# Patient Record
Sex: FEMALE | Race: WHITE | NOT HISPANIC OR LATINO | Employment: OTHER | URBAN - METROPOLITAN AREA
[De-identification: names, ages, dates, MRNs, and addresses within clinical notes are randomized per-mention and may not be internally consistent; named-entity substitution may affect disease eponyms.]

---

## 2017-02-27 RX ORDER — BISOPROLOL FUMARATE 5 MG/1
TABLET, FILM COATED ORAL
Qty: 90 TABLET | Refills: 0 | Status: SHIPPED | OUTPATIENT
Start: 2017-02-27 | End: 2017-03-30 | Stop reason: SDUPTHER

## 2017-03-30 RX ORDER — BISOPROLOL FUMARATE 5 MG/1
TABLET, FILM COATED ORAL
Qty: 30 TABLET | Refills: 5 | Status: SHIPPED | OUTPATIENT
Start: 2017-03-30 | End: 2017-03-30 | Stop reason: SDUPTHER

## 2017-03-30 RX ORDER — BISOPROLOL FUMARATE 5 MG/1
TABLET, FILM COATED ORAL
Qty: 90 TABLET | Refills: 1 | Status: SHIPPED | OUTPATIENT
Start: 2017-03-30 | End: 2017-09-24 | Stop reason: SDUPTHER

## 2017-04-17 RX ORDER — FUROSEMIDE 40 MG/1
TABLET ORAL
Qty: 90 TABLET | Refills: 0 | Status: SHIPPED | OUTPATIENT
Start: 2017-04-17 | End: 2017-07-15 | Stop reason: SDUPTHER

## 2017-04-19 ENCOUNTER — OFFICE VISIT (OUTPATIENT)
Dept: CARDIOLOGY | Facility: CLINIC | Age: 82
End: 2017-04-19

## 2017-04-19 VITALS
HEART RATE: 84 BPM | DIASTOLIC BLOOD PRESSURE: 60 MMHG | SYSTOLIC BLOOD PRESSURE: 140 MMHG | BODY MASS INDEX: 21.35 KG/M2 | HEIGHT: 62 IN | WEIGHT: 116 LBS

## 2017-04-19 DIAGNOSIS — I50.22 CHRONIC SYSTOLIC CONGESTIVE HEART FAILURE (HCC): ICD-10-CM

## 2017-04-19 DIAGNOSIS — I42.9 CARDIOMYOPATHY (HCC): ICD-10-CM

## 2017-04-19 DIAGNOSIS — I48.0 PAROXYSMAL ATRIAL FIBRILLATION (HCC): ICD-10-CM

## 2017-04-19 DIAGNOSIS — R55 NEAR SYNCOPE: Primary | ICD-10-CM

## 2017-04-19 PROCEDURE — 93000 ELECTROCARDIOGRAM COMPLETE: CPT | Performed by: INTERNAL MEDICINE

## 2017-04-19 PROCEDURE — 99214 OFFICE O/P EST MOD 30 MIN: CPT | Performed by: INTERNAL MEDICINE

## 2017-04-19 RX ORDER — MIRTAZAPINE 15 MG/1
15 TABLET, FILM COATED ORAL NIGHTLY
COMMUNITY

## 2017-04-19 NOTE — PROGRESS NOTES
Date of Office Visit: 17  Encounter Provider: Nolberto Dye MD  Place of Service: [facility] CARDIOLOGY  Patient Name: Janna Finley  :10/29/1932      Chief Complaint   Patient presents with   • Cardiomyopathy   • Congestive Heart Failure     History of Present Illness  HPI Comments:  The patient is an 83-year-old female with a history of nonischemic cardiomyopathy.  Her  initial left ventricular ejection fraction was 30%.  Subsequent echocardiogram showed that  her LV function had returned to normal.  She has had chronic dyspnea but normal cardiac  evaluations.  She then, in 2015, was switching family physicians and presented to her office and was  having abdominal discomfort, doubled over.  She was sent to the emergency room.  When she  went there, she was found to have severe acute diverticulitis but was also found to be in  atrial fibrillation.  She was seen by Dr. Ana Jefferson.  She had an echocardiogram.   Apparently, she had a valve that was a little leaky, which is chronic.  She was  rate-controlled and placed on Eliquis.  She has also now been diagnosed with chronic  obstructive pulmonary disease.   The patient unfortunately presented to [facility] at the end of 2015  with the abrupt onset of right arm and right leg weakness, aphasia. While there she had  an extensive evaluation. She ruled in for a cerebrovascular accident. She had an  echocardiogram and then a transesophageal echocardiogram which revealed a left ventricular  ejection fraction of approximately 35-40%. The left atrium was severely enlarged but no  thrombus seen. The right atrial cavity was moderately enlarged. She had no significant  valvular disease and no evidence of thrombus and grade III atheroma. They felt that her  Eliquis dose should be increased and they upped it to 5 mg per day. She also had some  congestive heart failure there reportedly and started her on Lasix as  well as  spironolactone and she was started on a statin. She now comes in for followup.  She first stated she is doing fine at that her sister to that she's having these episodes then this detail laboratory  Where she acutely just sitting there becomes diaphoretic dizzy lightheaded and near syncopal she is not lost consciousness.  She's had no chest pain or pressure.  No palpitations.  No paralysis, paresthesias or dysarthria.  She has her baseline dyspnea which is been unchanged and no orthopnea or PND.  She gets these episodes of near-syncope at least once a week.    Cardiomyopathy   Pertinent negatives include no abdominal pain, congestion, diaphoresis, fever, headaches, joint swelling, myalgias, nausea, numbness, rash, vertigo, vomiting or weakness.   Congestive Heart Failure   Associated symptoms include shortness of breath. Pertinent negatives include no abdominal pain, muscle weakness or nocturia.   Atrial Fibrillation   Symptoms include dizziness and shortness of breath. Symptoms are negative for weakness. Past medical history includes atrial fibrillation and CHF. There is no history of AICD.         Past Medical History:   Diagnosis Date   • Atrial fibrillation    • Breast cancer    • Diastolic dysfunction     grade II   • Difficulty breathing    • Diverticulosis    • Dizziness    • History of CT scan     lung pulmonary nodule (<6cm)   • Hypertension    • IBS (irritable bowel syndrome)    • Left atrial enlargement    • Mitral valve insufficiency    • Nonischemic cardiomyopathy          Past Surgical History:   Procedure Laterality Date   • APPENDECTOMY     • BREAST SURGERY     • CARDIAC CATHETERIZATION     • TONSILLECTOMY           Current Outpatient Prescriptions on File Prior to Visit   Medication Sig Dispense Refill   • albuterol (PROAIR HFA) 108 (90 BASE) MCG/ACT inhaler as needed.     • alendronate (FOSAMAX) 70 MG tablet Take 1 tablet by mouth once a week.     • apixaban (ELIQUIS) 5 MG tablet tablet  Take 1 tablet by mouth every 12 (twelve) hours. 180 tablet 3   • atorvastatin (LIPITOR) 40 MG tablet Take 1 tablet by mouth daily. Take 1 tablet daily at bedtime 90 tablet 1   • bisoprolol (ZEBeta) 5 MG tablet TAKE 1 TABLET BY MOUTH DAILY 90 tablet 1   • budesonide-formoterol (SYMBICORT) 160-4.5 MCG/ACT inhaler Take 2 puffs by nebulization 2 (two) times a day. Rinse mouth after use     • Calcium 600 MG tablet Take 1 tablet by mouth daily.     • furosemide (LASIX) 40 MG tablet TAKE 1 TABLET BY MOUTH DAILY 90 tablet 0   • Multiple Vitamin (MULTIVITAMIN) capsule Take 1 capsule by mouth daily.     • spironolactone (ALDACTONE) 50 MG tablet Take 1 tablet by mouth daily. Take 1 tablet daily 90 tablet 3   • [DISCONTINUED] acetaminophen (TYLENOL) 325 MG tablet Take 1 tablet by mouth as needed.     • [DISCONTINUED] escitalopram (LEXAPRO) 10 MG tablet Take 20 mg by mouth daily.     • [DISCONTINUED] Probiotic capsule Take by mouth. Use as directed       No current facility-administered medications on file prior to visit.          Social History     Social History   • Marital status:      Spouse name: N/A   • Number of children: N/A   • Years of education: N/A     Occupational History   • Not on file.     Social History Main Topics   • Smoking status: Never Smoker   • Smokeless tobacco: Not on file   • Alcohol use 0.6 oz/week     1 Glasses of wine per week   • Drug use: Not on file   • Sexual activity: Not on file     Other Topics Concern   • Not on file     Social History Narrative       Family History   Problem Relation Age of Onset   • Heart attack Mother    • Breast cancer Mother    • Heart attack Father    • Testicular cancer Son    • Stroke Maternal Grandmother    • Hypertension Other          Review of Systems   Constitution: Negative for decreased appetite, diaphoresis, fever, weakness, malaise/fatigue, weight gain and weight loss.   HENT: Negative for congestion, headaches, hearing loss, nosebleeds and tinnitus.  "   Eyes: Negative for blurred vision, double vision, vision loss in left eye, vision loss in right eye and visual disturbance.   Cardiovascular: Negative for orthopnea.        As noted in HPI   Respiratory: Positive for shortness of breath.         As noted HPI   Endocrine: Negative for cold intolerance and heat intolerance.   Hematologic/Lymphatic: Negative for bleeding problem. Does not bruise/bleed easily.   Skin: Negative for color change, flushing, itching and rash.   Musculoskeletal: Negative for arthritis, back pain, joint pain, joint swelling, muscle weakness and myalgias.   Gastrointestinal: Negative for bloating, abdominal pain, constipation, diarrhea, dysphagia, heartburn, hematemesis, hematochezia, melena, nausea and vomiting.   Genitourinary: Negative for bladder incontinence, dysuria, frequency, nocturia and urgency.   Neurological: Positive for dizziness and light-headedness. Negative for focal weakness, loss of balance, numbness, paresthesias and vertigo.   Psychiatric/Behavioral: Negative for depression, memory loss and substance abuse.       Procedures      ECG 12 Lead  Date/Time: 4/19/2017 1:24 PM  Performed by: PRAVIN CROFT  Authorized by: PRAVIN CROFT   Comparison: compared with previous ECG   Comparison to previous ECG: Back in afib  Rhythm: atrial fibrillation  Rate: normal  QRS axis: normal                 Objective:    /60 (BP Location: Right arm)  Pulse 84  Ht 61.5\" (156.2 cm)  Wt 116 lb (52.6 kg)  BMI 21.56 kg/m2       Physical Exam  Physical Exam   Constitutional: She is oriented to person, place, and time. She appears well-developed and well-nourished. No distress.   HENT:   Head: Normocephalic.   Eyes: Conjunctivae are normal. Pupils are equal, round, and reactive to light. No scleral icterus.   Neck: Normal carotid pulses, no hepatojugular reflux and no JVD present. Carotid bruit is not present. No tracheal deviation, no edema and no erythema present. No thyromegaly " present.   Cardiovascular: Normal rate, S1 normal, S2 normal, normal heart sounds and intact distal pulses.  An irregularly irregular rhythm present.  No extrasystoles are present. PMI is not displaced.  Exam reveals no distant heart sounds and no friction rub.    No murmur heard.  Pulses:       Carotid pulses are 2+ on the right side, and 2+ on the left side.       Radial pulses are 2+ on the right side, and 2+ on the left side.        Femoral pulses are 2+ on the right side, and 2+ on the left side.       Dorsalis pedis pulses are 2+ on the right side, and 2+ on the left side.        Posterior tibial pulses are 2+ on the right side, and 2+ on the left side.   Pulmonary/Chest: Effort normal. No respiratory distress. She has decreased breath sounds in the right lower field and the left lower field. She has no wheezes. She has no rhonchi. She has no rales. She exhibits no tenderness.   Abdominal: Soft. Bowel sounds are normal. She exhibits no distension and no mass. There is no hepatosplenomegaly. There is no tenderness. There is no rebound and no guarding.   Musculoskeletal: She exhibits no edema, tenderness or deformity.   Neurological: She is alert and oriented to person, place, and time.   Skin: Skin is warm and dry. No rash noted. She is not diaphoretic. No cyanosis or erythema. No pallor. Nails show no clubbing.   Psychiatric: She has a normal mood and affect. Her speech is normal and behavior is normal. Judgment and thought content normal.           Assessment:   1. This is an 83-year-old female with history of nonischemic cardiomyopathy. An  echocardiogram in 08/2015 revealed a left ventricular ejection fraction of 35-40%.   Heart Failure  Assessment  • NYHA class II - There is slight limitation of physical activity. The patient is comfortable at rest, but physical activity results in fatigue, palpitations or shortness of breath.  • ACE inhibitor not prescribed for medical reasons  • Beta blocker  prescribed  • Diuretics prescribed  • Angiotensin receptor blocker (ARB) not prescribed for medical reasons  • Calcium channel blockers not prescribed  • Left ventricular function is moderately reduced by qualitative assessment    Plan  • The patient has received heart failure education on the following topics: dietary sodium restriction, medication instructions, minimizing or avoiding NSAID use, symptom management, physical activity, weight monitoring and minimizing alcohol intake  • The heart failure care plan was discussed with the patient today including: continuing the current program    Subjective/Objective  • The patient reports dyspnea    • Physical exam findings negative for rales and elevated JVP.    She had been on lisinopril but her blood pressure was low. Will continue the same.      2. Paroxysmal atrial fibrillation.   Atrial Fibrillation and Atrial Flutter  Assessment  • The patient has paroxysmal atrial fibrillation  • This is non-valvular in etiology  • The patient's CHADS2-VASc score is 8  • A [id number] score of 2 or more is considered a high risk for a thromboembolic event  • Apixaban prescribed    Plan  • Attempt to maintain sinus rhythm  • Continue apixaban for antithrombotic therapy, bleeding issues discussed  • Continue beta blocker for rhythm control       3. Cerebrovascular accident in 08/2015. 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 He did have grade 3 atheroma on a transesophageal echocardiogram. No definite thrombus. Continue the same.  4. Mitral insufficiency, just mild. This was noted in 08/2015 on a transesophageal echocardiogram.   5. Hypertension. Her blood pressure is adequately controlled.   6. History of breast cancer status post surgery. She follows with oncology.   7. History of chronic obstructive pulmonary disease.    8.  Near-syncope.  Certainly concerned with her back in atrial fibrillation that she may be becoming bradycardic.  Therefore it have her wear a Zio Patch to make sure  she is not getting bradycardic.         Plan:

## 2017-05-05 RX ORDER — SPIRONOLACTONE 50 MG/1
TABLET, FILM COATED ORAL
Qty: 90 TABLET | Refills: 2 | Status: SHIPPED | OUTPATIENT
Start: 2017-05-05 | End: 2018-01-25 | Stop reason: SDUPTHER

## 2017-05-22 ENCOUNTER — TELEPHONE (OUTPATIENT)
Dept: CARDIOLOGY | Facility: CLINIC | Age: 82
End: 2017-05-22

## 2017-05-22 LAB — TOAL ENROLLMENT DAYS: 14

## 2017-05-25 RX ORDER — APIXABAN 5 MG/1
TABLET, FILM COATED ORAL
Qty: 60 TABLET | Refills: 11 | Status: SHIPPED | OUTPATIENT
Start: 2017-05-25 | End: 2017-07-07 | Stop reason: SDUPTHER

## 2017-07-17 RX ORDER — FUROSEMIDE 40 MG/1
TABLET ORAL
Qty: 90 TABLET | Refills: 1 | Status: SHIPPED | OUTPATIENT
Start: 2017-07-17 | End: 2018-01-10 | Stop reason: SDUPTHER

## 2017-09-25 RX ORDER — BISOPROLOL FUMARATE 5 MG/1
TABLET, FILM COATED ORAL
Qty: 90 TABLET | Refills: 1 | Status: SHIPPED | OUTPATIENT
Start: 2017-09-25 | End: 2018-03-26 | Stop reason: SDUPTHER

## 2017-12-27 ENCOUNTER — TELEPHONE (OUTPATIENT)
Dept: ONCOLOGY | Facility: HOSPITAL | Age: 82
End: 2017-12-27

## 2017-12-27 NOTE — TELEPHONE ENCOUNTER
Pt's sister called wanting to make an appt for the pt. She states the last few nights she has developed a stabbing pain under her right arm, on the side of the mastectomy. Her sister states she did not do anything to provoke this or have any sort of injury that would cause this. I informed sister that pt has not been seen in our office in over 3 years, I asked her first to call the breast surgeon to see if they could eval to see what may be causing this pain and if not she should contact her PCP to have a referral placed for CBC or her surgeon to re evaluate.     Sister states pt's insurance does not need a referral but I explained that we would need documentation from a MD stating she needs to be seen by an oncologist. Sister v/u

## 2018-01-10 RX ORDER — FUROSEMIDE 40 MG/1
TABLET ORAL
Qty: 90 TABLET | Refills: 1 | Status: SHIPPED | OUTPATIENT
Start: 2018-01-10 | End: 2018-04-27 | Stop reason: SDUPTHER

## 2018-01-25 RX ORDER — SPIRONOLACTONE 50 MG/1
TABLET, FILM COATED ORAL
Qty: 90 TABLET | Refills: 0 | Status: SHIPPED | OUTPATIENT
Start: 2018-01-25 | End: 2018-04-22 | Stop reason: SDUPTHER

## 2018-03-26 RX ORDER — BISOPROLOL FUMARATE 5 MG/1
TABLET, FILM COATED ORAL
Qty: 90 TABLET | Refills: 0 | Status: SHIPPED | OUTPATIENT
Start: 2018-03-26 | End: 2018-04-27 | Stop reason: SDUPTHER

## 2018-04-24 RX ORDER — SPIRONOLACTONE 50 MG/1
TABLET, FILM COATED ORAL
Qty: 90 TABLET | Refills: 0 | Status: SHIPPED | OUTPATIENT
Start: 2018-04-24 | End: 2018-04-27 | Stop reason: SDUPTHER

## 2018-04-27 ENCOUNTER — OFFICE VISIT (OUTPATIENT)
Dept: CARDIOLOGY | Facility: CLINIC | Age: 83
End: 2018-04-27

## 2018-04-27 VITALS
HEIGHT: 62 IN | BODY MASS INDEX: 21.16 KG/M2 | SYSTOLIC BLOOD PRESSURE: 140 MMHG | HEART RATE: 94 BPM | DIASTOLIC BLOOD PRESSURE: 84 MMHG | WEIGHT: 115 LBS

## 2018-04-27 DIAGNOSIS — I34.0 NON-RHEUMATIC MITRAL REGURGITATION: ICD-10-CM

## 2018-04-27 DIAGNOSIS — I48.0 PAROXYSMAL ATRIAL FIBRILLATION (HCC): Primary | ICD-10-CM

## 2018-04-27 DIAGNOSIS — I10 ESSENTIAL HYPERTENSION: ICD-10-CM

## 2018-04-27 DIAGNOSIS — I50.22 CHRONIC SYSTOLIC CONGESTIVE HEART FAILURE (HCC): ICD-10-CM

## 2018-04-27 PROCEDURE — 93000 ELECTROCARDIOGRAM COMPLETE: CPT | Performed by: INTERNAL MEDICINE

## 2018-04-27 PROCEDURE — 99214 OFFICE O/P EST MOD 30 MIN: CPT | Performed by: INTERNAL MEDICINE

## 2018-04-27 RX ORDER — FUROSEMIDE 40 MG/1
40 TABLET ORAL DAILY
Qty: 90 TABLET | Refills: 3 | Status: SHIPPED | OUTPATIENT
Start: 2018-04-27 | End: 2019-07-10 | Stop reason: SDUPTHER

## 2018-04-27 RX ORDER — SPIRONOLACTONE 50 MG/1
50 TABLET, FILM COATED ORAL DAILY
Qty: 90 TABLET | Refills: 3 | Status: SHIPPED | OUTPATIENT
Start: 2018-04-27 | End: 2018-07-23 | Stop reason: SDUPTHER

## 2018-04-27 RX ORDER — BISOPROLOL FUMARATE 5 MG/1
5 TABLET, FILM COATED ORAL DAILY
Qty: 90 TABLET | Refills: 3 | Status: SHIPPED | OUTPATIENT
Start: 2018-04-27 | End: 2019-07-15 | Stop reason: SDUPTHER

## 2018-04-27 NOTE — PROGRESS NOTES
Date of Office Visit: 18  Encounter Provider: Nolberto Dye MD  Place of Service: [facility] CARDIOLOGY  Patient Name: Janna Finley  :10/29/1932  Referral Provider:Kaylynn Lomax MD      Chief Complaint   Patient presents with   • Cardiomyopathy   • Congestive Heart Failure   • Atrial Fibrillation     History of Present Illness   The patient is an 85-year-old female with a history of nonischemic cardiomyopathy.  Her  initial left ventricular ejection fraction was 30%.  Subsequent echocardiogram showed that  her LV function had returned to normal.  She has had chronic dyspnea but normal cardiac  evaluations.  She then, in 2015, was switching family physicians and presented to her office and was  having abdominal discomfort, doubled over.  She was sent to the emergency room.  When she  went there, she was found to have severe acute diverticulitis but was also found to be in  atrial fibrillation.  She was seen by Dr. Ana Jefferson.  She had an echocardiogram.   Apparently, she had a valve that was a little leaky, which is chronic.  She was  rate-controlled and placed on Eliquis.  She has also now been diagnosed with chronic  obstructive pulmonary disease.   The patient unfortunately presented to [facility] at the end of 2015  with the abrupt onset of right arm and right leg weakness, aphasia. While there she had  an extensive evaluation. She ruled in for a cerebrovascular accident. She had an  echocardiogram and then a transesophageal echocardiogram which revealed a left ventricular  ejection fraction of approximately 35-40%. The left atrium was severely enlarged but no  thrombus seen. The right atrial cavity was moderately enlarged. She had no significant  valvular disease and no evidence of thrombus and grade III atheroma. They felt that her  Eliquis dose should be increased and they upped it to 5 mg per day. She also had some  congestive heart  failure there reportedly and started her on Lasix as well as  spironolactone and she was started on a statin. She now comes in for followup.  The patient denies chest pain, pressure and heaviness.  She has shortness of breath with activity but this is been unchanged she refuses to use her daily inhaler and just recently started using her nebulizer, no othopnea or PND. No palpitations, near syncope or syncope. No stroke type symptoms like paralysis, paresthesia, abrupt vision loss and dysarthria. No bleeding like blood in the stool or dark stools.   She hasn't really do any structured exercising but seems to be fairly active and overall feels like she's doing very well no complaints.  She was having some dizziness near syncope in 2017 April she wore a prolonged monitor that showed no significant pauses or bradycardia did have some wide complex nonsustained rhythm they may have been ventricular.  Again she's had no further episodes.      Cardiomyopathy   Pertinent negatives include no abdominal pain, congestion, diaphoresis, fever, headaches, joint swelling, myalgias, nausea, numbness, rash, vertigo, vomiting or weakness.   Congestive Heart Failure   Associated symptoms include shortness of breath. Pertinent negatives include no abdominal pain, muscle weakness or nocturia.   Atrial Fibrillation   Symptoms include dizziness and shortness of breath. Symptoms are negative for weakness. Past medical history includes atrial fibrillation and CHF. There is no history of AICD.         Past Medical History:   Diagnosis Date   • Atrial fibrillation    • Breast cancer    • Diastolic dysfunction     grade II   • Difficulty breathing    • Diverticulosis    • Dizziness    • History of CT scan     lung pulmonary nodule (<6cm)   • Hypertension    • IBS (irritable bowel syndrome)    • Left atrial enlargement    • Mitral valve insufficiency    • Nonischemic cardiomyopathy          Past Surgical History:   Procedure Laterality Date   •  APPENDECTOMY     • BREAST SURGERY     • CARDIAC CATHETERIZATION     • TONSILLECTOMY           Current Outpatient Prescriptions on File Prior to Visit   Medication Sig Dispense Refill   • albuterol (PROAIR HFA) 108 (90 BASE) MCG/ACT inhaler as needed.     • alendronate (FOSAMAX) 70 MG tablet Take 1 tablet by mouth once a week.     • apixaban (ELIQUIS) 5 MG tablet tablet Take one tablet by mouth twice daily 180 tablet 3   • atorvastatin (LIPITOR) 40 MG tablet Take 1 tablet by mouth daily. Take 1 tablet daily at bedtime 90 tablet 1   • Calcium 600 MG tablet Take 1 tablet by mouth daily.     • mirtazapine (REMERON) 15 MG tablet Take 15 mg by mouth Every Night.     • Multiple Vitamin (MULTIVITAMIN) capsule Take 1 capsule by mouth daily.     • tiotropium (SPIRIVA) 18 MCG per inhalation capsule Place 1 capsule into inhaler and inhale Daily.     • [DISCONTINUED] bisoprolol (ZEBeta) 5 MG tablet TAKE 1 TABLET BY MOUTH DAILY 90 tablet 0   • [DISCONTINUED] furosemide (LASIX) 40 MG tablet TAKE 1 TABLET BY MOUTH DAILY 90 tablet 1   • [DISCONTINUED] spironolactone (ALDACTONE) 50 MG tablet TAKE 1 TABLET BY MOUTH DAILY 90 tablet 0   • [DISCONTINUED] budesonide-formoterol (SYMBICORT) 160-4.5 MCG/ACT inhaler Take 2 puffs by nebulization 2 (two) times a day. Rinse mouth after use       No current facility-administered medications on file prior to visit.          Social History     Social History   • Marital status:      Spouse name: N/A   • Number of children: N/A   • Years of education: N/A     Occupational History   • Not on file.     Social History Main Topics   • Smoking status: Never Smoker   • Smokeless tobacco: Not on file   • Alcohol use 0.6 oz/week     1 Glasses of wine per week   • Drug use: Unknown   • Sexual activity: Not on file     Other Topics Concern   • Not on file     Social History Narrative   • No narrative on file       Family History   Problem Relation Age of Onset   • Heart attack Mother    • Breast  "cancer Mother    • Heart attack Father    • Testicular cancer Son    • Stroke Maternal Grandmother    • Hypertension Other          Review of Systems   Constitution: Negative for decreased appetite, diaphoresis, fever, weakness, malaise/fatigue, weight gain and weight loss.   HENT: Negative for congestion, hearing loss, nosebleeds and tinnitus.    Eyes: Negative for blurred vision, double vision, vision loss in left eye, vision loss in right eye and visual disturbance.   Cardiovascular: Negative for orthopnea.        As noted in HPI   Respiratory: Positive for shortness of breath.         As noted HPI   Endocrine: Negative for cold intolerance and heat intolerance.   Hematologic/Lymphatic: Negative for bleeding problem. Does not bruise/bleed easily.   Skin: Negative for color change, flushing, itching and rash.   Musculoskeletal: Negative for arthritis, back pain, joint pain, joint swelling, muscle weakness and myalgias.   Gastrointestinal: Negative for bloating, abdominal pain, constipation, diarrhea, dysphagia, heartburn, hematemesis, hematochezia, melena, nausea and vomiting.   Genitourinary: Negative for bladder incontinence, dysuria, frequency, nocturia and urgency.   Neurological: Positive for dizziness. Negative for focal weakness, headaches, light-headedness, loss of balance, numbness, paresthesias and vertigo.   Psychiatric/Behavioral: Negative for depression, memory loss and substance abuse.       Procedures      ECG 12 Lead  Date/Time: 4/27/2018 2:46 PM  Performed by: PRAVIN CROFT  Authorized by: PRAVIN CROFT   Comparison: compared with previous ECG   Similar to previous ECG  Rhythm: atrial fibrillation  Rate: normal  QRS axis: normal                  Objective:    /84 (BP Location: Right arm)   Pulse 94   Ht 156.2 cm (61.5\")   Wt 52.2 kg (115 lb)   BMI 21.38 kg/m²        Physical Exam  Physical Exam   Constitutional: She is oriented to person, place, and time. She appears " well-developed and well-nourished. No distress.   HENT:   Head: Normocephalic.   Eyes: Conjunctivae are normal. Pupils are equal, round, and reactive to light. No scleral icterus.   Neck: Normal carotid pulses, no hepatojugular reflux and no JVD present. Carotid bruit is not present. No tracheal deviation, no edema and no erythema present. No thyromegaly present.   Cardiovascular: Normal rate, S1 normal, S2 normal, normal heart sounds and intact distal pulses.  An irregularly irregular rhythm present.  No extrasystoles are present. PMI is not displaced.  Exam reveals no distant heart sounds and no friction rub.    No murmur heard.  Pulses:       Carotid pulses are 2+ on the right side, and 2+ on the left side.       Radial pulses are 2+ on the right side, and 2+ on the left side.        Femoral pulses are 2+ on the right side, and 2+ on the left side.       Dorsalis pedis pulses are 2+ on the right side, and 2+ on the left side.        Posterior tibial pulses are 2+ on the right side, and 2+ on the left side.   Pulmonary/Chest: Effort normal. No respiratory distress. She has decreased breath sounds in the right lower field and the left lower field. She has no wheezes. She has no rhonchi. She has no rales. She exhibits no tenderness.   Abdominal: Soft. Bowel sounds are normal. She exhibits no distension and no mass. There is no hepatosplenomegaly. There is no tenderness. There is no rebound and no guarding.   Musculoskeletal: She exhibits edema (1+ bilateral pretibial). She exhibits no tenderness or deformity.   Neurological: She is alert and oriented to person, place, and time.   Skin: Skin is warm and dry. No rash noted. She is not diaphoretic. No cyanosis or erythema. No pallor. Nails show no clubbing.   Psychiatric: She has a normal mood and affect. Her speech is normal and behavior is normal. Judgment and thought content normal.           Assessment:   1. This is an 85-year-old female with history of nonischemic  cardiomyopathy. An  echocardiogram in 08/2015 revealed a left ventricular ejection fraction of 35-40%.   Heart Failure  Assessment  • NYHA class II - There is slight limitation of physical activity. The patient is comfortable at rest, but physical activity results in fatigue, palpitations or shortness of breath.  • ACE inhibitor not prescribed for medical reasons  • Beta blocker prescribed  • Diuretics prescribed  • Angiotensin receptor blocker (ARB) not prescribed for medical reasons  • Calcium channel blockers not prescribed  • Left ventricular function is moderately reduced by qualitative assessment    Plan  • The patient has received heart failure education on the following topics: dietary sodium restriction, medication instructions, minimizing or avoiding NSAID use, symptom management, physical activity, weight monitoring and minimizing alcohol intake  • The heart failure care plan was discussed with the patient today including: continuing the current program    Subjective/Objective  • The patient reports dyspnea    • Physical exam findings negative for rales and elevated JVP.    She had been on lisinopril but her blood pressure was low. Will continue the same.       2. Chronic atrial fibrillation.   Atrial Fibrillation and Atrial Flutter  Assessment  • The patient has permanent atrial fibrillation  • This is non-valvular in etiology  • The patient's CHADS2-VASc score is 8  • A [id number] score of 2 or more is considered a high risk for a thromboembolic event  • Apixaban prescribed    Plan  • Attempt to maintain sinus rhythm  • Continue apixaban for antithrombotic therapy, bleeding issues discussed  • Continue beta blocker for rhythm control  She is asymptomatic.  I'm concerned her last GFR was 41 she has a follow-up next week on this if he gets 35 or below will need to adjust her eliquis.        3. Cerebrovascular accident in 08/2015. 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 He did have grade 3 atheroma on a  transesophageal echocardiogram. No definite thrombus. Continue the same.  4. Mitral insufficiency, just mild. This was noted in 08/2015 on a transesophageal echocardiogram.   5. Hypertension. Her blood pressure is adequately controlled.   6. History of breast cancer status post surgery. She follows with oncology.   7. History of chronic obstructive pulmonary disease.    8.  Near-syncope.  No recurrence relatively negative workup 2017.         Plan:

## 2018-05-07 ENCOUNTER — TELEPHONE (OUTPATIENT)
Dept: CARDIOLOGY | Facility: CLINIC | Age: 83
End: 2018-05-07

## 2018-05-07 NOTE — TELEPHONE ENCOUNTER
Received outside lab results ordered by Kaylynn Lomax MD.   Placing in your inbox for review.       Patient's phone number is [phone]./ FITO

## 2018-06-25 RX ORDER — BISOPROLOL FUMARATE 5 MG/1
TABLET, FILM COATED ORAL
Qty: 90 TABLET | Refills: 0 | Status: SHIPPED | OUTPATIENT
Start: 2018-06-25 | End: 2018-09-22 | Stop reason: SDUPTHER

## 2018-07-10 RX ORDER — APIXABAN 5 MG/1
TABLET, FILM COATED ORAL
Qty: 180 TABLET | Refills: 0 | Status: SHIPPED | OUTPATIENT
Start: 2018-07-10 | End: 2018-10-07 | Stop reason: SDUPTHER

## 2018-07-23 RX ORDER — SPIRONOLACTONE 50 MG/1
TABLET, FILM COATED ORAL
Qty: 90 TABLET | Refills: 2 | Status: SHIPPED | OUTPATIENT
Start: 2018-07-23 | End: 2021-04-20 | Stop reason: SDUPTHER

## 2018-09-24 RX ORDER — BISOPROLOL FUMARATE 5 MG/1
TABLET, FILM COATED ORAL
Qty: 90 TABLET | Refills: 1 | Status: SHIPPED | OUTPATIENT
Start: 2018-09-24 | End: 2019-10-10 | Stop reason: SDUPTHER

## 2018-10-08 RX ORDER — APIXABAN 5 MG/1
TABLET, FILM COATED ORAL
Qty: 180 TABLET | Refills: 0 | Status: SHIPPED | OUTPATIENT
Start: 2018-10-08 | End: 2019-01-06 | Stop reason: SDUPTHER

## 2018-11-27 ENCOUNTER — TELEPHONE (OUTPATIENT)
Dept: CARDIOLOGY | Facility: CLINIC | Age: 83
End: 2018-11-27

## 2018-12-03 ENCOUNTER — TELEPHONE (OUTPATIENT)
Dept: SOCIAL WORK | Facility: HOSPITAL | Age: 83
End: 2018-12-03

## 2018-12-03 NOTE — TELEPHONE ENCOUNTER
I received a referral 11/30/18 from [place] [facility] that patient could not get her labetalol filled at [facility]. I called [facility] [phone] and found prescription had been sent to her mail order pharmacy and local [facility]. The  Mail order pharmacy has already processed prescription, so insurance is not going to pay for both. The cost for patient to private pay for a week of labetalol is $16.30. I arranged for patient to  2 week supply of medication and private pay, so she will have enough before the mail order one arrives. Patient and her  were agreeable to this and were going last Friday to pick it up. Jack

## 2019-01-08 RX ORDER — APIXABAN 5 MG/1
TABLET, FILM COATED ORAL
Qty: 180 TABLET | Refills: 1 | Status: SHIPPED | OUTPATIENT
Start: 2019-01-08 | End: 2019-07-10 | Stop reason: SDUPTHER

## 2019-05-03 RX ORDER — SPIRONOLACTONE 50 MG/1
50 TABLET, FILM COATED ORAL DAILY
Qty: 90 TABLET | Refills: 0 | Status: SHIPPED | OUTPATIENT
Start: 2019-05-03 | End: 2019-08-01 | Stop reason: SDUPTHER

## 2019-07-11 RX ORDER — FUROSEMIDE 40 MG/1
40 TABLET ORAL DAILY
Qty: 90 TABLET | Refills: 0 | Status: SHIPPED | OUTPATIENT
Start: 2019-07-11 | End: 2019-10-08 | Stop reason: SDUPTHER

## 2019-07-11 RX ORDER — APIXABAN 5 MG/1
TABLET, FILM COATED ORAL
Qty: 180 TABLET | Refills: 0 | Status: SHIPPED | OUTPATIENT
Start: 2019-07-11 | End: 2019-10-08 | Stop reason: SDUPTHER

## 2019-07-15 RX ORDER — BISOPROLOL FUMARATE 5 MG/1
5 TABLET, FILM COATED ORAL DAILY
Qty: 90 TABLET | Refills: 0 | Status: SHIPPED | OUTPATIENT
Start: 2019-07-15 | End: 2019-10-10 | Stop reason: SDUPTHER

## 2019-08-01 RX ORDER — SPIRONOLACTONE 50 MG/1
50 TABLET, FILM COATED ORAL DAILY
Qty: 90 TABLET | Refills: 0 | Status: SHIPPED | OUTPATIENT
Start: 2019-08-01 | End: 2019-10-28 | Stop reason: SDUPTHER

## 2019-08-01 NOTE — TELEPHONE ENCOUNTER
LARS on 4/27/18 states the spironolactone 500 mg was discontinued. It's still currently on pt's med list. Please advise.    Thanks Ingris

## 2019-10-09 RX ORDER — APIXABAN 5 MG/1
TABLET, FILM COATED ORAL
Qty: 180 TABLET | Refills: 0 | Status: SHIPPED | OUTPATIENT
Start: 2019-10-09 | End: 2020-01-06

## 2019-10-09 RX ORDER — FUROSEMIDE 40 MG/1
40 TABLET ORAL DAILY
Qty: 90 TABLET | Refills: 0 | Status: SHIPPED | OUTPATIENT
Start: 2019-10-09 | End: 2020-01-06

## 2019-10-10 RX ORDER — BISOPROLOL FUMARATE 5 MG/1
5 TABLET, FILM COATED ORAL DAILY
Qty: 90 TABLET | Refills: 0 | Status: SHIPPED | OUTPATIENT
Start: 2019-10-10 | End: 2020-01-09

## 2019-10-28 ENCOUNTER — OFFICE VISIT (OUTPATIENT)
Dept: CARDIOLOGY | Facility: CLINIC | Age: 84
End: 2019-10-28

## 2019-10-28 VITALS
WEIGHT: 100.4 LBS | HEART RATE: 83 BPM | HEIGHT: 61 IN | BODY MASS INDEX: 18.96 KG/M2 | SYSTOLIC BLOOD PRESSURE: 120 MMHG | DIASTOLIC BLOOD PRESSURE: 80 MMHG

## 2019-10-28 DIAGNOSIS — I34.0 NON-RHEUMATIC MITRAL REGURGITATION: ICD-10-CM

## 2019-10-28 DIAGNOSIS — I50.22 CHRONIC SYSTOLIC CONGESTIVE HEART FAILURE (HCC): ICD-10-CM

## 2019-10-28 DIAGNOSIS — I10 ESSENTIAL HYPERTENSION: ICD-10-CM

## 2019-10-28 DIAGNOSIS — I48.0 PAROXYSMAL ATRIAL FIBRILLATION (HCC): Primary | ICD-10-CM

## 2019-10-28 PROCEDURE — 99214 OFFICE O/P EST MOD 30 MIN: CPT | Performed by: INTERNAL MEDICINE

## 2019-10-28 NOTE — PROGRESS NOTES
Date of Office Visit: 10/28/19  Encounter Provider: Nolberto Dye MD  Place of Service: [facility] CARDIOLOGY  Patient Name: Janna Finley  :10/29/1932  Referral Provider:No ref. provider found      Chief Complaint   Patient presents with   • Follow-up     History of Present Illness   The patient is an 86-year-old female with a history of nonischemic cardiomyopathy.  Her  initial left ventricular ejection fraction was 30%.  Subsequent echocardiogram showed that  her LV function had returned to normal.  She has had chronic dyspnea but normal cardiac  evaluations.  She then, in 2015, was switching family physicians and presented to her office and was  having abdominal discomfort, doubled over.  She was sent to the emergency room.  When she  went there, she was found to have severe acute diverticulitis but was also found to be in  atrial fibrillation.  She was seen by Dr. Ana Jefferson.  She had an echocardiogram.   Apparently, she had a valve that was a little leaky, which is chronic.  She was  rate-controlled and placed on Eliquis.  She has also now been diagnosed with chronic  obstructive pulmonary disease.   The patient unfortunately presented to [facility] at the end of 2015  with the abrupt onset of right arm and right leg weakness, aphasia. While there she had  an extensive evaluation. She ruled in for a cerebrovascular accident. She had an  echocardiogram and then a transesophageal echocardiogram which revealed a left ventricular  ejection fraction of approximately 35-40%. The left atrium was severely enlarged but no  thrombus seen. The right atrial cavity was moderately enlarged. She had no significant  valvular disease and no evidence of thrombus and grade III atheroma. They felt that her  Eliquis dose should be increased and they upped it to 5 mg per day. She also had some  congestive heart failure there reportedly and started her on Lasix as well  as  spironolactone and she was started on a statin. She now comes in for followup.  She is been doing reasonably well.  She gets short of breath but that is been unchanged.  No chest pain or pressure.  No palpitations no near syncope or syncope.  No orthopnea or PND.  He lives with her daughter overall feels like she is doing well.      Cardiomyopathy   Pertinent negatives include no abdominal pain, congestion, diaphoresis, fever, headaches, joint swelling, myalgias, nausea, numbness, rash, vertigo, vomiting or weakness.   Congestive Heart Failure   Pertinent negatives include no abdominal pain, muscle weakness or nocturia.   Atrial Fibrillation   Symptoms include dizziness. Symptoms are negative for weakness. Past medical history includes atrial fibrillation and CHF. There is no history of AICD.         Past Medical History:   Diagnosis Date   • Atrial fibrillation (CMS/HCC)    • Breast cancer (CMS/HCC)    • Diastolic dysfunction     grade II   • Difficulty breathing    • Diverticulosis    • Dizziness    • History of CT scan     lung pulmonary nodule (<6cm)   • Hypertension    • IBS (irritable bowel syndrome)    • Left atrial enlargement    • Mitral valve insufficiency    • Nonischemic cardiomyopathy (CMS/HCC)          Past Surgical History:   Procedure Laterality Date   • APPENDECTOMY     • BREAST SURGERY     • CARDIAC CATHETERIZATION     • TONSILLECTOMY           Current Outpatient Medications on File Prior to Visit   Medication Sig Dispense Refill   • albuterol (PROAIR HFA) 108 (90 BASE) MCG/ACT inhaler as needed.     • alendronate (FOSAMAX) 70 MG tablet Take 1 tablet by mouth once a week.     • atorvastatin (LIPITOR) 40 MG tablet Take 1 tablet by mouth daily. Take 1 tablet daily at bedtime 90 tablet 1   • bisoprolol (ZEBeta) 5 MG tablet TAKE 1 TABLET BY MOUTH DAILY 90 tablet 0   • Calcium 600 MG tablet Take 1 tablet by mouth daily.     • ELIQUIS 5 MG tablet tablet TAKE 1 TABLET BY MOUTH TWICE DAILY 180 tablet 0    • furosemide (LASIX) 40 MG tablet TAKE 1 TABLET BY MOUTH DAILY 90 tablet 0   • mirtazapine (REMERON) 15 MG tablet Take 15 mg by mouth Every Night.     • Multiple Vitamin (MULTIVITAMIN) capsule Take 1 capsule by mouth daily.     • spironolactone (ALDACTONE) 50 MG tablet TAKE 1 TABLET BY MOUTH DAILY 90 tablet 2   • tiotropium (SPIRIVA) 18 MCG per inhalation capsule Place 1 capsule into inhaler and inhale Daily.     • [DISCONTINUED] spironolactone (ALDACTONE) 50 MG tablet TAKE 1 TABLET BY MOUTH DAILY 90 tablet 0     No current facility-administered medications on file prior to visit.          Social History     Socioeconomic History   • Marital status:      Spouse name: Not on file   • Number of children: Not on file   • Years of education: Not on file   • Highest education level: Not on file   Tobacco Use   • Smoking status: Never Smoker   • Smokeless tobacco: Never Used   Substance and Sexual Activity   • Alcohol use: Yes     Alcohol/week: 0.6 oz     Types: 1 Glasses of wine per week   Lifestyle   • Physical activity:     Days per week: Patient refused     Minutes per session: Patient refused   • Stress: Not on file       Family History   Problem Relation Age of Onset   • Heart attack Mother    • Breast cancer Mother    • Heart attack Father    • Testicular cancer Son    • Stroke Maternal Grandmother    • Hypertension Other          Review of Systems   Constitution: Negative for decreased appetite, diaphoresis, fever, weakness, malaise/fatigue, weight gain and weight loss.   HENT: Negative for congestion, hearing loss, nosebleeds and tinnitus.    Eyes: Negative for blurred vision, double vision, vision loss in left eye, vision loss in right eye and visual disturbance.   Cardiovascular:        As noted in HPI   Respiratory:        As noted HPI   Endocrine: Negative for cold intolerance and heat intolerance.   Hematologic/Lymphatic: Negative for bleeding problem. Does not bruise/bleed easily.   Skin: Negative  for color change, flushing, itching and rash.   Musculoskeletal: Negative for arthritis, back pain, joint pain, joint swelling, muscle weakness and myalgias.   Gastrointestinal: Negative for bloating, abdominal pain, constipation, diarrhea, dysphagia, heartburn, hematemesis, hematochezia, melena, nausea and vomiting.   Genitourinary: Negative for bladder incontinence, dysuria, frequency, nocturia and urgency.   Neurological: Positive for dizziness. Negative for focal weakness, headaches, light-headedness, loss of balance, numbness, paresthesias and vertigo.   Psychiatric/Behavioral: Negative for depression, memory loss and substance abuse.       Procedures    Procedures        Objective:    There were no vitals taken for this visit.       Physical Exam  Physical Exam   Constitutional: She is oriented to person, place, and time. She appears well-developed and well-nourished. No distress.   HENT:   Head: Normocephalic.   Eyes: Conjunctivae are normal. Pupils are equal, round, and reactive to light. No scleral icterus.   Neck: Normal carotid pulses, no hepatojugular reflux and no JVD present. Carotid bruit is not present. No tracheal deviation, no edema and no erythema present. No thyromegaly present.   Cardiovascular: Normal rate, S1 normal, S2 normal, normal heart sounds and intact distal pulses. An irregularly irregular rhythm present.  No extrasystoles are present. PMI is not displaced. Exam reveals no gallop, no distant heart sounds and no friction rub.   No murmur heard.  Pulses:       Carotid pulses are 2+ on the right side, and 2+ on the left side.       Radial pulses are 2+ on the right side, and 2+ on the left side.        Femoral pulses are 2+ on the right side, and 2+ on the left side.       Dorsalis pedis pulses are 2+ on the right side, and 2+ on the left side.        Posterior tibial pulses are 2+ on the right side, and 2+ on the left side.   Pulmonary/Chest: Effort normal and breath sounds normal. No  respiratory distress. She has no decreased breath sounds. She has no wheezes. She has no rhonchi. She has no rales. She exhibits no tenderness.   Abdominal: Soft. Bowel sounds are normal. She exhibits no distension and no mass. There is no hepatosplenomegaly. There is no tenderness. There is no rebound and no guarding.   Musculoskeletal: She exhibits no edema, tenderness or deformity.   Neurological: She is alert and oriented to person, place, and time.   Skin: Skin is warm and dry. No rash noted. She is not diaphoretic. No cyanosis or erythema. No pallor. Nails show no clubbing.   Psychiatric: She has a normal mood and affect. Her speech is normal and behavior is normal. Judgment and thought content normal.           Assessment:   1. This is an 86-year-old female with history of nonischemic cardiomyopathy. An echocardiogram in 08/2015 revealed a left ventricular ejection fraction of 35-40%.   Heart Failure  Assessment  • NYHA class II - There is slight limitation of physical activity. The patient is comfortable at rest, but physical activity results in fatigue, palpitations or shortness of breath.  • ACE inhibitor not prescribed for medical reasons  • Beta blocker prescribed  • Diuretics prescribed  • Angiotensin receptor blocker (ARB) not prescribed for medical reasons  • Calcium channel blockers not prescribed  • Left ventricular function is moderately reduced by qualitative assessment     Plan  • The patient has received heart failure education on the following topics: dietary sodium restriction, medication instructions, minimizing or avoiding NSAID use, symptom management, physical activity, weight monitoring and minimizing alcohol intake  • The heart failure care plan was discussed with the patient today including: continuing the current program     Subjective/Objective  • The patient reports dyspnea    • Physical exam findings negative for rales and elevated JVP.  She will continue the same and see us again  in follow-up in a year.     2. Chronic atrial fibrillation.   Atrial Fibrillation and Atrial Flutter  Assessment  • The patient has permanent atrial fibrillation  • This is non-valvular in etiology  • The patient's CHADS2-VASc score is 8  • A [id number] score of 2 or more is considered a high risk for a thromboembolic event  • Apixaban prescribed     Plan  • Attempt to maintain sinus rhythm  • Continue apixaban for antithrombotic therapy, bleeding issues discussed  • Continue beta blocker for rhythm control  Asymptomatic.  Had a stroke on 2.5 mg twice a day of Eliquis now on 5 mg twice a day        3. Cerebrovascular accident in 08/2015. 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 He did have grade 3 atheroma on a transesophageal echocardiogram. No definite thrombus. Continue the same.  4. Mitral insufficiency, just mild. This was noted in 08/2015 on a transesophageal echocardiogram.   5. Hypertension. Her blood pressure is adequately controlled.   6. History of breast cancer status post surgery. She follows with oncology.   7. History of chronic obstructive pulmonary disease.    8.  Near-syncope.  No recurrence relatively negative workup 2017.          Plan:

## 2019-10-29 RX ORDER — SPIRONOLACTONE 50 MG/1
50 TABLET, FILM COATED ORAL DAILY
Qty: 90 TABLET | Refills: 2 | Status: SHIPPED | OUTPATIENT
Start: 2019-10-29 | End: 2020-07-22

## 2020-01-06 RX ORDER — FUROSEMIDE 40 MG/1
40 TABLET ORAL DAILY
Qty: 90 TABLET | Refills: 1 | Status: SHIPPED | OUTPATIENT
Start: 2020-01-06 | End: 2020-07-01

## 2020-01-06 RX ORDER — APIXABAN 5 MG/1
TABLET, FILM COATED ORAL
Qty: 180 TABLET | Refills: 1 | Status: SHIPPED | OUTPATIENT
Start: 2020-01-06 | End: 2020-07-01

## 2020-01-09 RX ORDER — BISOPROLOL FUMARATE 5 MG/1
5 TABLET, FILM COATED ORAL DAILY
Qty: 90 TABLET | Refills: 1 | Status: SHIPPED | OUTPATIENT
Start: 2020-01-09 | End: 2020-07-07

## 2020-07-01 RX ORDER — FUROSEMIDE 40 MG/1
40 TABLET ORAL DAILY
Qty: 90 TABLET | Refills: 1 | Status: SHIPPED | OUTPATIENT
Start: 2020-07-01 | End: 2020-10-16

## 2020-07-01 RX ORDER — APIXABAN 5 MG/1
TABLET, FILM COATED ORAL
Qty: 180 TABLET | Refills: 1 | Status: SHIPPED | OUTPATIENT
Start: 2020-07-01 | End: 2020-10-16

## 2020-07-07 RX ORDER — BISOPROLOL FUMARATE 5 MG/1
5 TABLET, FILM COATED ORAL DAILY
Qty: 90 TABLET | Refills: 1 | Status: SHIPPED | OUTPATIENT
Start: 2020-07-07 | End: 2021-01-05

## 2020-07-07 RX ORDER — BISOPROLOL FUMARATE 5 MG/1
5 TABLET, FILM COATED ORAL DAILY
Qty: 90 TABLET | Refills: 1 | Status: SHIPPED | OUTPATIENT
Start: 2020-07-07 | End: 2020-10-28 | Stop reason: SDUPTHER

## 2020-07-22 RX ORDER — SPIRONOLACTONE 50 MG/1
50 TABLET, FILM COATED ORAL DAILY
Qty: 90 TABLET | Refills: 2 | Status: SHIPPED | OUTPATIENT
Start: 2020-07-22 | End: 2020-10-28 | Stop reason: SDUPTHER

## 2020-10-15 ENCOUNTER — TELEPHONE (OUTPATIENT)
Dept: CARDIOLOGY | Facility: CLINIC | Age: 85
End: 2020-10-15

## 2020-10-15 NOTE — TELEPHONE ENCOUNTER
Patient sister, Stephanie calling stating patient went to her PCP a couple days ago. She stated PCP is worried about patients B/P and recommended she call her cardiologist.      Patient sister is requesting a call back.    Thank you,   Magda Hogan LPN

## 2020-10-28 ENCOUNTER — OFFICE VISIT (OUTPATIENT)
Dept: CARDIOLOGY | Facility: CLINIC | Age: 85
End: 2020-10-28

## 2020-10-28 VITALS
HEIGHT: 61 IN | SYSTOLIC BLOOD PRESSURE: 120 MMHG | WEIGHT: 102.2 LBS | BODY MASS INDEX: 19.3 KG/M2 | HEART RATE: 99 BPM | DIASTOLIC BLOOD PRESSURE: 80 MMHG

## 2020-10-28 DIAGNOSIS — I48.0 PAROXYSMAL ATRIAL FIBRILLATION (HCC): Primary | ICD-10-CM

## 2020-10-28 DIAGNOSIS — I34.0 NON-RHEUMATIC MITRAL REGURGITATION: ICD-10-CM

## 2020-10-28 DIAGNOSIS — I50.22 CHRONIC SYSTOLIC CONGESTIVE HEART FAILURE (HCC): ICD-10-CM

## 2020-10-28 DIAGNOSIS — I10 ESSENTIAL HYPERTENSION: ICD-10-CM

## 2020-10-28 PROCEDURE — 93000 ELECTROCARDIOGRAM COMPLETE: CPT | Performed by: INTERNAL MEDICINE

## 2020-10-28 PROCEDURE — 99214 OFFICE O/P EST MOD 30 MIN: CPT | Performed by: INTERNAL MEDICINE

## 2020-10-28 NOTE — PROGRESS NOTES
Date of Office Visit: 10/28/20  Encounter Provider: Nolberto Dye MD  Place of Service: [facility] CARDIOLOGY  Patient Name: Janna Finley  :10/29/1932  Referral Provider:No ref. provider found      No chief complaint on file.    History of Present Illness   The patient is an 87-year-old female with a history of nonischemic cardiomyopathy.  Her  initial left ventricular ejection fraction was 30%.  Subsequent echocardiogram showed that  her LV function had returned to normal.  She has had chronic dyspnea but normal cardiac  evaluations.  She then, in 2015, was switching family physicians and presented to her office and was  having abdominal discomfort, doubled over.  She was sent to the emergency room.  When she  went there, she was found to have severe acute diverticulitis but was also found to be in  atrial fibrillation.  She was seen by Dr. nAa Jefferson.  She had an echocardiogram.   Apparently, she had a valve that was a little leaky, which is chronic.  She was  rate-controlled and placed on Eliquis.  She has also now been diagnosed with chronic  obstructive pulmonary disease.   The patient unfortunately presented to [facility] at the end of 2015  with the abrupt onset of right arm and right leg weakness, aphasia. While there she had  an extensive evaluation. She ruled in for a cerebrovascular accident. She had an  echocardiogram and then a transesophageal echocardiogram which revealed a left ventricular  ejection fraction of approximately 35-40%. The left atrium was severely enlarged but no  thrombus seen. The right atrial cavity was moderately enlarged. She had no significant  valvular disease and no evidence of thrombus and grade III atheroma. They felt that her  Eliquis dose should be increased and they upped it to 5 mg per day. She also had some  congestive heart failure there reportedly and started her on Lasix as well as  spironolactone and she  was started on a statin. She now comes in for followup.  Doing about the same.  She has her baseline dyspnea.  May be slightly worse no orthopnea or PND.  No chest pain or pressure.  No palpitations no near syncope or syncope.  She is pretty much been staying at home and her family is been going on getting her groceries and such for her.  She does have some fatigue.  Had lost some weight just because she has not had any appetite.    Cardiomyopathy  Pertinent negatives include no abdominal pain, congestion, diaphoresis, fever, headaches, joint swelling, myalgias, nausea, numbness, rash, vertigo, vomiting or weakness.   Congestive Heart Failure  Pertinent negatives include no abdominal pain, muscle weakness or nocturia.   Atrial Fibrillation  Symptoms are negative for weakness. Past medical history includes atrial fibrillation and CHF. There is no history of AICD.         Past Medical History:   Diagnosis Date   • Atrial fibrillation (CMS/HCC)    • Breast cancer (CMS/HCC)    • Diastolic dysfunction     grade II   • Difficulty breathing    • Diverticulosis    • Dizziness    • History of CT scan     lung pulmonary nodule (<6cm)   • Hypertension    • IBS (irritable bowel syndrome)    • Left atrial enlargement    • Mitral valve insufficiency    • Nonischemic cardiomyopathy (CMS/HCC)          Past Surgical History:   Procedure Laterality Date   • APPENDECTOMY     • BREAST SURGERY     • CARDIAC CATHETERIZATION     • TONSILLECTOMY           Current Outpatient Medications on File Prior to Visit   Medication Sig Dispense Refill   • albuterol (PROAIR HFA) 108 (90 BASE) MCG/ACT inhaler as needed.     • alendronate (FOSAMAX) 70 MG tablet Take 1 tablet by mouth once a week.     • apixaban (ELIQUIS) 2.5 MG tablet tablet Take 1 tablet by mouth 2 (Two) Times a Day. 30 tablet 6   • atorvastatin (LIPITOR) 40 MG tablet Take 1 tablet by mouth daily. Take 1 tablet daily at bedtime 90 tablet 1   • bisoprolol (ZEBeta) 5 MG tablet TAKE 1  TABLET BY MOUTH DAILY 90 tablet 1   • bisoprolol (ZEBeta) 5 MG tablet TAKE 1 TABLET BY MOUTH DAILY 90 tablet 1   • Calcium 600 MG tablet Take 1 tablet by mouth daily.     • mirtazapine (REMERON) 15 MG tablet Take 15 mg by mouth Every Night.     • Multiple Vitamin (MULTIVITAMIN) capsule Take 1 capsule by mouth daily.     • spironolactone (ALDACTONE) 50 MG tablet TAKE 1 TABLET BY MOUTH DAILY 90 tablet 2   • spironolactone (ALDACTONE) 50 MG tablet TAKE 1 TABLET BY MOUTH DAILY 90 tablet 2   • tiotropium (SPIRIVA) 18 MCG per inhalation capsule Place 1 capsule into inhaler and inhale Daily.       No current facility-administered medications on file prior to visit.          Social History     Socioeconomic History   • Marital status:      Spouse name: Not on file   • Number of children: Not on file   • Years of education: Not on file   • Highest education level: Not on file   Tobacco Use   • Smoking status: Never Smoker   • Smokeless tobacco: Never Used   Substance and Sexual Activity   • Alcohol use: Yes     Alcohol/week: 1.0 standard drinks     Types: 1 Glasses of wine per week   Lifestyle   • Physical activity     Days per week: Patient refused     Minutes per session: Patient refused   • Stress: Not on file       Family History   Problem Relation Age of Onset   • Heart attack Mother    • Breast cancer Mother    • Heart attack Father    • Testicular cancer Son    • Stroke Maternal Grandmother    • Hypertension Other          Review of Systems   Constitution: Positive for malaise/fatigue and weight loss. Negative for decreased appetite, diaphoresis, fever and weight gain.   HENT: Negative for congestion, hearing loss, nosebleeds and tinnitus.    Eyes: Negative for blurred vision, double vision, vision loss in left eye, vision loss in right eye and visual disturbance.   Cardiovascular:        As noted in HPI   Respiratory:        As noted HPI   Endocrine: Negative for cold intolerance and heat intolerance.    Hematologic/Lymphatic: Negative for bleeding problem. Does not bruise/bleed easily.   Skin: Negative for color change, flushing, itching and rash.   Musculoskeletal: Negative for arthritis, back pain, joint pain, joint swelling, muscle weakness and myalgias.   Gastrointestinal: Negative for bloating, abdominal pain, constipation, diarrhea, dysphagia, heartburn, hematemesis, hematochezia, melena, nausea and vomiting.   Genitourinary: Negative for bladder incontinence, dysuria, frequency, nocturia and urgency.   Neurological: Negative for focal weakness, headaches, light-headedness, loss of balance, numbness, paresthesias, vertigo and weakness.   Psychiatric/Behavioral: Negative for depression, memory loss and substance abuse. The patient is nervous/anxious.        Procedures      ECG 12 Lead    Date/Time: 10/28/2020 1:56 PM  Performed by: Nolberto Dye MD  Authorized by: Nolberto Dye MD   Comparison: compared with previous ECG   Similar to previous ECG  Rhythm: atrial fibrillation  Rate: normal  QRS axis: normal                  Objective:    There were no vitals taken for this visit.       Physical Exam  Vitals signs reviewed.   Constitutional:       General: Not in acute distress.     Appearance: Well-developed. Not diaphoretic.   Eyes:      General: No scleral icterus.     Conjunctiva/sclera: Conjunctivae normal.      Pupils: Pupils are equal, round, and reactive to light.   HENT:      Head: Normocephalic.   Neck:      Musculoskeletal: No edema or erythema.      Thyroid: No thyromegaly.      Vascular: Normal carotid pulses. No carotid bruit, hepatojugular reflux or JVD.      Trachea: No tracheal deviation.   Pulmonary:      Effort: Pulmonary effort is normal. No respiratory distress.      Breath sounds: Examination of the right-lower field reveals decreased breath sounds. Examination of the left-lower field reveals decreased breath sounds. Decreased breath sounds present. No wheezing. No rhonchi. No  rales.   Chest:      Chest wall: Not tender to palpatation.   Cardiovascular:      PMI at left midclavicular line. Normal rate. Irregularly irregular rhythm. S1 with normal intensity. S2 with normal intensity.      Murmurs: There is a grade 2/6 systolic murmur at the LLSB.      No gallop. No click. No rub.   Pulses:     Intact distal pulses.   Edema:     Peripheral edema absent.   Abdominal:      General: Bowel sounds are normal. There is no distension.      Palpations: Abdomen is soft. There is no abdominal mass.      Tenderness: There is no abdominal tenderness. There is no guarding or rebound.   Musculoskeletal:         General: No tenderness or deformity.      Extremities: No clubbing present.  Skin:     General: Skin is warm and dry. There is no cyanosis.      Coloration: Skin is not pale.      Findings: No erythema or rash.   Neurological:      Mental Status: Alert and oriented to person, place, and time.   Psychiatric:         Speech: Speech normal.         Behavior: Behavior normal.         Thought Content: Thought content normal.         Judgment: Judgment normal.             Assessment:   1. This is an 86-year-old female with history of nonischemic cardiomyopathy. An echocardiogram in 08/2015 revealed a left ventricular ejection fraction of 35-40%.  No heart failure.  We will continue the same see us in follow-up in about 1 year.  2. Chronic atrial fibrillation. The patient's CHADS2-VASc score is 8.  Still asymptomatic rate controlled on Eliquis.  Creatinine October 2020 was 0.8 continue the same.  3. Cerebrovascular accident in 08/2015. 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 He did have grade 3 atheroma on a transesophageal echocardiogram. No definite thrombus. Continue the same.  4. Mitral insufficiency, just mild. This was noted in 08/2015 on a transesophageal echocardiogram.   5. Hypertension. Her blood pressure is adequately controlled.   6. History of breast cancer status post surgery. She follows with  oncology.   7. History of chronic obstructive pulmonary disease.    8.  Near-syncope.  No recurrence relatively negative workup 2017.           Plan:

## 2021-01-05 RX ORDER — FUROSEMIDE 40 MG/1
40 TABLET ORAL DAILY
Qty: 90 TABLET | Refills: 1 | OUTPATIENT
Start: 2021-01-05

## 2021-01-05 RX ORDER — APIXABAN 5 MG/1
TABLET, FILM COATED ORAL
Qty: 180 TABLET | Refills: 1 | OUTPATIENT
Start: 2021-01-05

## 2021-01-05 RX ORDER — BISOPROLOL FUMARATE 5 MG/1
5 TABLET, FILM COATED ORAL DAILY
Qty: 90 TABLET | Refills: 1 | Status: SHIPPED | OUTPATIENT
Start: 2021-01-05 | End: 2021-07-12 | Stop reason: SDUPTHER

## 2021-04-20 RX ORDER — SPIRONOLACTONE 50 MG/1
50 TABLET, FILM COATED ORAL DAILY
Qty: 90 TABLET | Refills: 2 | Status: SHIPPED | OUTPATIENT
Start: 2021-04-20

## 2021-07-12 RX ORDER — BISOPROLOL FUMARATE 5 MG/1
5 TABLET, FILM COATED ORAL DAILY
Qty: 90 TABLET | Refills: 2 | Status: SHIPPED | OUTPATIENT
Start: 2021-07-12

## 2021-12-30 RX ORDER — SPIRONOLACTONE 50 MG/1
50 TABLET, FILM COATED ORAL DAILY
Qty: 90 TABLET | Refills: 2 | OUTPATIENT
Start: 2021-12-30

## 2022-04-06 ENCOUNTER — TELEPHONE (OUTPATIENT)
Dept: CARDIOLOGY | Facility: CLINIC | Age: 87
End: 2022-04-06

## 2022-04-06 RX ORDER — BISOPROLOL FUMARATE 5 MG/1
5 TABLET, FILM COATED ORAL DAILY
Qty: 90 TABLET | Refills: 2 | OUTPATIENT
Start: 2022-04-06
